# Patient Record
Sex: MALE | Race: WHITE | Employment: OTHER | URBAN - METROPOLITAN AREA
[De-identification: names, ages, dates, MRNs, and addresses within clinical notes are randomized per-mention and may not be internally consistent; named-entity substitution may affect disease eponyms.]

---

## 2022-08-18 ENCOUNTER — OFFICE VISIT (OUTPATIENT)
Dept: ORTHOPEDIC SURGERY | Age: 72
End: 2022-08-18
Payer: MEDICARE

## 2022-08-18 VITALS — BODY MASS INDEX: 23.8 KG/M2 | WEIGHT: 170 LBS | HEIGHT: 71 IN

## 2022-08-18 DIAGNOSIS — M25.562 LEFT KNEE PAIN, UNSPECIFIED CHRONICITY: Primary | ICD-10-CM

## 2022-08-18 PROCEDURE — G8428 CUR MEDS NOT DOCUMENT: HCPCS | Performed by: ORTHOPAEDIC SURGERY

## 2022-08-18 PROCEDURE — 1123F ACP DISCUSS/DSCN MKR DOCD: CPT | Performed by: ORTHOPAEDIC SURGERY

## 2022-08-18 PROCEDURE — 3017F COLORECTAL CA SCREEN DOC REV: CPT | Performed by: ORTHOPAEDIC SURGERY

## 2022-08-18 PROCEDURE — 99204 OFFICE O/P NEW MOD 45 MIN: CPT | Performed by: ORTHOPAEDIC SURGERY

## 2022-08-18 PROCEDURE — G8420 CALC BMI NORM PARAMETERS: HCPCS | Performed by: ORTHOPAEDIC SURGERY

## 2022-08-18 PROCEDURE — 1036F TOBACCO NON-USER: CPT | Performed by: ORTHOPAEDIC SURGERY

## 2022-08-18 RX ORDER — MULTIVITAMIN WITH FOLIC ACID 400 MCG
1 TABLET ORAL DAILY
COMMUNITY

## 2022-08-18 RX ORDER — NAPROXEN SODIUM 220 MG
220 TABLET ORAL 2 TIMES DAILY WITH MEALS
COMMUNITY

## 2022-08-18 RX ORDER — TADALAFIL 20 MG/1
20 TABLET ORAL DAILY PRN
COMMUNITY

## 2022-08-18 RX ORDER — ROSUVASTATIN CALCIUM 20 MG/1
20 TABLET, COATED ORAL DAILY
COMMUNITY

## 2022-08-18 NOTE — PROGRESS NOTES
Name: Arnie Hernandez. YOB: 1950  Gender: male  MRN: [id number]    CC:   Chief Complaint   Patient presents with    Knee Pain     Left Knee pain          HPI: Arnie Muller is a 66 y.o. male is here here for evaluation of bilateral knee pain. Left worse than right. The patient has had a previous ACL tear of the left knee requiring arthroscopy for debridement of the ACL. The pain has been present for years and is becoming worse. The pain is primarily on the Medial side of both knees  he describes the pain as aching  The pain is worse with going up and/or down stairs, rising after sitting, standing, and walking  The pain does not radiate down the leg. he denies numbness and tingling down the leg. Treatment so far has been prescription and OTC NSAIDS which have not effectively relieved pain/inflammation and activity modification with little relief. No Known Allergies      Review of Systems:  As per HPI. Pertinent positives and negatives are addressed with the patient, particularly those related to musculoskeletal concerns. Non-orthopaedic concerns were referred back to the primary care physician. PHYSICAL EXAMINATION:   The patient appears their stated age and they are in no distress. Ht 5' 11\" (1.803 m)   Wt 170 lb (77.1 kg)   BMI 23.71 kg/m²       The lower extremities are as described below. Circulation is normal with palpable pedal pulses bilaterally and no edema. There is no lymph adenopathy in the popliteal or malleolar region. The skin is without stasis disease distally bilaterally. Sensation is intact to light touch bilaterally. There is moderate tenderness to palpation over the medial joint line of the bilateral knee(s).   There is a large mass over the medial aspect of the left knee which could be a meniscal cyst.  No significant popliteal cyst.  The gait is noted to be with a slight trendelenburg and antalgia and stiff legged  Range of motion is 0-120 degrees on the right and 0-120 degrees on the left. bilateral knee: There is 2mm of anterior/posterior translation and 2mm of medial/lateral instability bilaterally. There is 5 degrees of varus alignment in the bilateral knee. Limb lengths are equal.  Quadriceps strength is excellent. Their judgment and insight are normal.  They are oriented to time, place and person. Their memory is good and the mood and affect appropriate. X-RAY:   AP, lateral, sunrise, and 45 degree  views of the bilateral knees are reviewed. There is joint space loss, eburnated bone, and osteophyte formation present. X-ray impression:  Advanced degenerative joint disease of bilateral  knee      IMPRESSION:     ICD-10-CM    1. Left knee pain, unspecified chronicity  M25.562 XR KNEE LEFT (MIN 4 VIEWS)     MRI KNEE LEFT W WO CONTRAST          RECOMMENDATIONS:    Reviewed x-ray findings with the patient. This patient's clinical history and physical exam is consistent with osteoarthritis of the bilateral  knee(s). We discussed the natural history of this condition as a degenerative process that causes inflammation of the joints due to a breakdown of cartilage, the hard, thick tissue that cushions the joints. We discussed that osteoarthritis never completely resolves on its own and symptoms including pain, stiffness, and swelling may wax and wane as the condition progresses. He understands that conservative treatments typically include activity modification, NSAIDs and physical therapy. Oral and/or steroid injections into the joint could be considered in resistant scenarios. Viscosupplementation injections may also be useful as a temporary cartilage replacement in certain patients. I advised to avoid any prolonged bedrest and to try to maintain ADLs as much as possible.  The patient was counseled to follow up with me should he develop any worsening symptoms that begin to limit activities of daily living.     ----TKA - Today we also discussed bilateral  knee replacement surgery. They are aware of the 1% risk of infection. They were also informed of the possibility of stroke, heart attack and blood clot; any of which could result in further hospitalization or death. I reviewed the procedure as well as the pre and post-operative process at length and written information was provided for further review. We are planning a Maljamar Makoplasty Robot Assisted TKA - The patient is aware that a preoperative 3D CT Scan is medically necessary for pre-op planning using the [employer]. This will be scheduled and arranged to be done prior to surgery. I have also recommended MRI scan of the left knee to make sure that this is a benign soft tissue mass and not some type of sarcoma.   4--this is a chronic illness/condition with exacerbation    Mónica Silva MD

## 2022-11-17 LAB — PROSTATE SPECIFIC ANTIGEN: 1.18 NG/ML

## 2023-01-16 ENCOUNTER — APPOINTMENT (OUTPATIENT)
Dept: CT IMAGING | Age: 73
End: 2023-01-16
Payer: MEDICARE

## 2023-01-16 ENCOUNTER — OFFICE VISIT (OUTPATIENT)
Dept: UROLOGY | Age: 73
End: 2023-01-16
Payer: MEDICARE

## 2023-01-16 ENCOUNTER — HOSPITAL ENCOUNTER (EMERGENCY)
Age: 73
Discharge: HOME | End: 2023-01-16
Attending: EMERGENCY MEDICINE | Admitting: EMERGENCY MEDICINE
Payer: MEDICARE

## 2023-01-16 VITALS
RESPIRATION RATE: 18 BRPM | BODY MASS INDEX: 23.8 KG/M2 | WEIGHT: 170 LBS | TEMPERATURE: 97.8 F | DIASTOLIC BLOOD PRESSURE: 99 MMHG | HEART RATE: 78 BPM | OXYGEN SATURATION: 97 % | SYSTOLIC BLOOD PRESSURE: 158 MMHG | HEIGHT: 71 IN

## 2023-01-16 DIAGNOSIS — S09.90XA INJURY OF HEAD, INITIAL ENCOUNTER: ICD-10-CM

## 2023-01-16 DIAGNOSIS — R97.20 ELEVATED PSA: Primary | ICD-10-CM

## 2023-01-16 DIAGNOSIS — S01.81XA FACIAL LACERATION, INITIAL ENCOUNTER: Primary | ICD-10-CM

## 2023-01-16 LAB
BILIRUBIN, URINE, POC: NEGATIVE
BLOOD URINE, POC: NEGATIVE
GLUCOSE URINE, POC: NEGATIVE
KETONES, URINE, POC: NEGATIVE
LEUKOCYTE ESTERASE, URINE, POC: NEGATIVE
NITRITE, URINE, POC: NEGATIVE
PH, URINE, POC: 6.5 (ref 4.6–8)
PROTEIN,URINE, POC: NEGATIVE
SPECIFIC GRAVITY, URINE, POC: 1.02 (ref 1–1.03)
URINALYSIS CLARITY, POC: NORMAL
URINALYSIS COLOR, POC: NORMAL
UROBILINOGEN, POC: NORMAL

## 2023-01-16 PROCEDURE — 1123F ACP DISCUSS/DSCN MKR DOCD: CPT | Performed by: UROLOGY

## 2023-01-16 PROCEDURE — G8420 CALC BMI NORM PARAMETERS: HCPCS | Performed by: UROLOGY

## 2023-01-16 PROCEDURE — 70450 CT HEAD/BRAIN W/O DYE: CPT

## 2023-01-16 PROCEDURE — 99284 EMERGENCY DEPT VISIT MOD MDM: CPT

## 2023-01-16 PROCEDURE — 81003 URINALYSIS AUTO W/O SCOPE: CPT | Performed by: UROLOGY

## 2023-01-16 PROCEDURE — 1036F TOBACCO NON-USER: CPT | Performed by: UROLOGY

## 2023-01-16 PROCEDURE — G8484 FLU IMMUNIZE NO ADMIN: HCPCS | Performed by: UROLOGY

## 2023-01-16 PROCEDURE — 72125 CT NECK SPINE W/O DYE: CPT

## 2023-01-16 PROCEDURE — 99204 OFFICE O/P NEW MOD 45 MIN: CPT | Performed by: UROLOGY

## 2023-01-16 PROCEDURE — G8427 DOCREV CUR MEDS BY ELIG CLIN: HCPCS | Performed by: UROLOGY

## 2023-01-16 PROCEDURE — 3017F COLORECTAL CA SCREEN DOC REV: CPT | Performed by: UROLOGY

## 2023-01-16 RX ORDER — TETANUS AND DIPHTHERIA TOXOIDS ADSORBED 2; 2 [LF]/.5ML; [LF]/.5ML
0.5 INJECTION INTRAMUSCULAR
Status: DISCONTINUED | OUTPATIENT
Start: 2023-01-16 | End: 2023-01-16

## 2023-01-16 ASSESSMENT — ENCOUNTER SYMPTOMS
EYE DISCHARGE: 0
EYE PAIN: 0
VOMITING: 0
APNEA: 0
ABDOMINAL PAIN: 0
DIARRHEA: 0
CONSTIPATION: 0
COUGH: 0
ABDOMINAL PAIN: 0
HEARTBURN: 1
EYE PAIN: 0
FACIAL SWELLING: 0
VOICE CHANGE: 0
BLOOD IN STOOL: 0
VOMITING: 0
PHOTOPHOBIA: 0
WHEEZING: 0
RHINORRHEA: 0
SKIN LESIONS: 0
EYE REDNESS: 0
TROUBLE SWALLOWING: 0
BACK PAIN: 0
WHEEZING: 0
DIARRHEA: 0
COUGH: 0
NAUSEA: 0
SHORTNESS OF BREATH: 0
CHEST TIGHTNESS: 0
SORE THROAT: 0
EYE DISCHARGE: 0
INDIGESTION: 1
SHORTNESS OF BREATH: 0

## 2023-01-16 ASSESSMENT — PAIN - FUNCTIONAL ASSESSMENT: PAIN_FUNCTIONAL_ASSESSMENT: 0-10

## 2023-01-16 ASSESSMENT — PAIN SCALES - GENERAL: PAINLEVEL_OUTOF10: 0

## 2023-01-16 NOTE — ED PROVIDER NOTES
Emergency Department Provider Note                   PCP:                Diamante Causey               Age: 74 y.o. Sex: male       ICD-10-CM    1. Facial laceration, initial encounter  S01.81XA       2. Injury of head, initial encounter  S09.90XA           DISPOSITION [place] 01/16/2023 12:49:38 PM        Medical Decision Making  In summary this is a 58-year-old male patient who presented for evaluation of laceration to head after a trip and fall that occurred last night. Low suspicion for serious cause of diagnosis such as brain bleed, basilar skull fracture, acute CVA. Grossly well-appearing with stable vitals. No active bleeding. Not on blood thinners. Clear mechanism causing the fall. Neurologic exam within normal limits. Agree with radiology interpretation the CT of head and neck unremarkable for acute process. Unfortunately did not get to fully evaluate the patient's laceration as he left without treatment being complete. My plan was to fully washout the laceration and evaluate for wound closure which is likely needed. However, the patient did receive his CT results prior to my being able to talk to him and decided that he was going to leave because he had someone to be. My nurse did try to stop him and get him to stay however he refused. He also refused to sign AMA and stated he did not have time. Amount and/or Complexity of Data Reviewed  Radiology: ordered. Risk  Prescription drug management. ED Course as of 01/16/23 1423   Mon Jan 16, 2023   1244 12:44 PM  [place] Zenaida to go discussed results with patient however was told that he had gotten them already on his phone and had left. He was apparently. Stating \"I have somewhere to be and cannot stay. \"  He did not get his tetanus shot and did not have his wound repaired today.  [NR]      ED Course User Index  [NR] DEVIN Branham        Orders Placed This Encounter   Procedures    CT Head W/O Contrast    CT CSpine W/O Contrast        Medications   tetanus & diphtheria toxoids (adult) 5-2 LFU injection 0.5 mL (0.5 mLs IntraMUSCular Not Given 1/16/23 1148)       New Prescriptions    No medications on file        Shellie Stuart is a 74 y.o. male who presents to the Emergency Department with chief complaint of    Chief Complaint   Patient presents with    Laceration    Head Injury      64-year-old male patient not on blood thinners presents today complaining of head laceration that occurred last night after tripping over his dog. He reports he hit his head against a wooden table. He states he cleaned it up at that time and went to bed. Today when he woke up he states he noticed there was still blood so he went to an urgent care who sent him here due to head injury. Denies any pain. He denies syncope, weakness, numbness, dysphagia, dysarthria, diplopia, dizziness, difficulty with gait, weakness. Unsure when last tetanus shot was but states he absolutely does not want a tetanus shot today. Patient is primary historian and quality is reliable. The history is provided by the patient. No  was used. Review of Systems   Constitutional:  Negative for fatigue and fever. HENT:  Negative for congestion, ear pain, facial swelling, hearing loss, rhinorrhea, sore throat, trouble swallowing and voice change. Eyes:  Negative for photophobia, pain, discharge, redness and visual disturbance. Respiratory:  Negative for apnea, cough, chest tightness, shortness of breath and wheezing. Cardiovascular:  Negative for chest pain and palpitations. Gastrointestinal:  Negative for abdominal pain, diarrhea and vomiting. Endocrine: Negative for polydipsia, polyphagia and polyuria. Genitourinary:  Negative for decreased urine volume, dysuria, flank pain, frequency and hematuria. Musculoskeletal:  Negative for arthralgias, joint swelling, myalgias and neck stiffness. Skin:  Positive for wound. Negative for rash. Neurological:  Negative for dizziness, syncope, speech difficulty, weakness, light-headedness and headaches. Hematological:  Does not bruise/bleed easily. Psychiatric/Behavioral:  Negative for self-injury and suicidal ideas. All other systems reviewed and are negative. History reviewed. No pertinent past medical history. History reviewed. No pertinent surgical history. History reviewed. No pertinent family history. Social History     Socioeconomic History    Marital status:      Spouse name: None    Number of children: None    Years of education: None    Highest education level: None   Tobacco Use    Smoking status: Never    Smokeless tobacco: Never         Patient has no known allergies. Previous Medications    MULTIPLE VITAMIN (MULTIVITAMIN) TABLET    Take 1 tablet by mouth daily    NAPROXEN SODIUM (ANAPROX) 220 MG TABLET    Take 220 mg by mouth 2 times daily (with meals)    ROSUVASTATIN (CRESTOR) 20 MG TABLET    Take 20 mg by mouth daily    TADALAFIL (CIALIS) 20 MG TABLET    Take 20 mg by mouth daily as needed        Vitals signs and nursing note reviewed. Patient Vitals for the past 4 hrs:   Temp Pulse Resp BP SpO2   01/16/23 1129 97.8 °F (36.6 °C) 78 18 (!) 158/99 97 %          Physical Exam  Vitals and nursing note reviewed. Constitutional:       General: He is not in acute distress. Appearance: Normal appearance. He is normal weight. He is not ill-appearing, toxic-appearing or diaphoretic. Comments: Overall very well-appearing and in no acute distress. Resting comfortably in chair. Obvious laceration to right side of forehead. Pleasant and cooperative. Alert and oriented x4. Normal mentation. No repetitive questioning. No active bleeding   HENT:      Head: Normocephalic and atraumatic. Comments: No skull deformity. There is a linear laceration in vertical plane on the right side of forehead approximately 4 cm long extending into subcutaneous tissue. Does not affect the galea. No signs of foreign body. Bleeding controlled. Right Ear: Tympanic membrane, ear canal and external ear normal. There is no impacted cerumen. Left Ear: Tympanic membrane, ear canal and external ear normal. There is no impacted cerumen. Ears:      Comments: No maya sign or hemotympanum bilaterally     Nose: Nose normal.      Comments: No septal hematoma bilaterally     Mouth/Throat:      Mouth: Mucous membranes are moist.      Pharynx: No oropharyngeal exudate or posterior oropharyngeal erythema. Comments: No signs of malocclusion or dental fracture  Eyes:      General: No scleral icterus. Right eye: No discharge. Left eye: No discharge. Extraocular Movements: Extraocular movements intact. Conjunctiva/sclera: Conjunctivae normal.      Pupils: Pupils are equal, round, and reactive to light. Comments: No nystagmus. Pupils equal reactive. No raccoon eyes bilaterally   Cardiovascular:      Rate and Rhythm: Normal rate and regular rhythm. Pulses: Normal pulses. Heart sounds: Normal heart sounds. Pulmonary:      Effort: Pulmonary effort is normal. No respiratory distress. Breath sounds: Normal breath sounds. No stridor. No wheezing, rhonchi or rales. Musculoskeletal:         General: Normal range of motion. Cervical back: Normal range of motion and neck supple. No rigidity or tenderness. Lymphadenopathy:      Cervical: No cervical adenopathy. Skin:     General: Skin is warm and dry. Capillary Refill: Capillary refill takes less than 2 seconds. Coloration: Skin is not jaundiced. Neurological:      General: No focal deficit present. Mental Status: He is alert and oriented to person, place, and time. Mental status is at baseline. Cranial Nerves: No cranial nerve deficit. Sensory: No sensory deficit. Motor: No weakness.       Coordination: Coordination normal.      Gait: Gait normal. Deep Tendon Reflexes: Reflexes normal.      Comments: No focal deficits found on exam.  [place] CT head score positive due to age        Procedures    Results for orders placed or performed during the hospital encounter of 01/16/23   CT Head W/O Contrast    Narrative    EXAMINATION: CT HEAD WO CONTRAST 1/16/2023 11:56 AM    ACCESSION NUMBER: [id number]    COMPARISON: None available    INDICATION: fall and head lac    TECHNIQUE: Multiple-row detector helical CT examination of the head without  intravenous contrast.     Radiation dose reduction techniques were used for this study. Our CT scanners  use one or all of the following: Automated exposure control, adjustment of the  mA and/or kV according to patient size, iterative reconstruction. FINDINGS:  BRAIN: No intracranial hemorrhage. No mass effect or herniation. The grey-white  matter differentiation is maintained. White matter is within normal limits for  age. VENTRICLES/EXTRA-AXIAL: No hydrocephalus or extra-axial fluid collection. BONES: No acute fracture. SOFT TISSUES: The paranasal sinuses and mastoid air cells are clear. Laceration  along the right forehead. Impression    1. No evidence of acute ischemia, hemorrhage, or mass effect. 2.  Scalp laceration along the right forehead. CT CSpine W/O Contrast    Narrative    EXAMINATION: CT CERVICAL SPINE WO CONTRAST 1/16/2023 11:56 AM    ACCESSION NUMBER: [id number]    COMPARISON: None available    INDICATION: fall and head lac    TECHNIQUE: Contiguous CT images of cervical spine were obtained without  intravenous contrast. Coronal and sagittal reformations were made. Radiation dose reduction techniques were used for this study. Our CT scanners  use one or all of the following: Automated exposure control, adjustment of the  mA and/or kV according to patient size, iterative reconstruction. FINDINGS:   ACUTE FINDINGS: No acute fracture.     VERTEBRAE: The craniocervical junction is unremarkable. The vertebral body  heights are maintained. Straightening of the normal cervical lordosis. DEGENERATIVE CHANGES: Moderate disc space narrowing with anterior osteophytes  extending from C4 to C7. Mild multilevel facet arthropathy. Fusion of the left  C2-C3 facets. SPINAL CANAL: No evidence of high grade central canal stenosis within the  limitations of this noncontrasted CT. SURGICAL CHANGES: None. SOFT TISSUES: No evidence of prevertebral soft tissue swelling. The  paravertebral soft tissues are unremarkable. Impression    1. No acute abnormality. 2.  Moderate multilevel degenerative changes. Results for orders placed or performed in visit on 01/16/23   AMB POC URINALYSIS DIP STICK AUTO W/O MICRO   Result Value Ref Range    Color (UA POC)      Clarity (UA POC)      Glucose, Urine, POC Negative Negative    Bilirubin, Urine, POC Negative Negative    KETONES, Urine, POC Negative Negative    Specific Gravity, Urine, POC 1.025 1.001 - 1.035    Blood (UA POC) Negative Negative    pH, Urine, POC 6.5 4.6 - 8.0    Protein, Urine, POC Negative Negative    Urobilinogen, POC 1 mg/dL     Nitrite, Urine, POC Negative Negative    Leukocyte Esterase, Urine, POC Negative Negative        CT Head W/O Contrast   Final Result   1. No evidence of acute ischemia, hemorrhage, or mass effect. 2.  Scalp laceration along the right forehead. CT CSpine W/O Contrast   Final Result   1. No acute abnormality. 2.  Moderate multilevel degenerative changes. Voice dictation software was used during the making of this note. This software is not perfect and grammatical and other typographical errors may be present. This note has not been completely proofread for errors.      Ellamae Holter, [place]  01/16/23 8286

## 2023-01-16 NOTE — PROGRESS NOTES
[facility]  [address] [place] [address] [address]  [place], [address] [facility]  [address] [facility] : 1950    Chief Complaint   Patient presents with    New Patient    Elevated PSA          HPI     Carlyn Stein is a 74 y.o.  male who is referred to clinic for elevated PSA evaluation. He also has PMH of ED and is on cialis for management of that. He reports having PSA at the [address] [place] [address],[address] in the fall  that as slightly elevated. He had PSA with his PCP 22 that was 5.1. He reports normal PSAs prior to . No known cause for PSA elevation. Denies FH of CAP. No bothersome urgency, frequency, hematuria, urinary incontinence. Does have nocturia 1 per night. Has never had biopsy / MRI prostate. Lab Results   Component Value Date    PSA 1.180 2022       History reviewed. No pertinent past medical history. History reviewed. No pertinent surgical history. Current Outpatient Medications   Medication Sig Dispense Refill    Multiple Vitamin (MULTIVITAMIN) tablet Take 1 tablet by mouth daily      naproxen sodium (ANAPROX) 220 MG tablet Take 220 mg by mouth 2 times daily (with meals)      rosuvastatin (CRESTOR) 20 MG tablet Take 20 mg by mouth daily      tadalafil (CIALIS) 20 MG tablet Take 20 mg by mouth daily as needed       No current facility-administered medications for this visit.      No Known Allergies  Social History     Socioeconomic History    Marital status:      Spouse name: Not on file    Number of children: Not on file    Years of education: Not on file    Highest education level: Not on file   Occupational History    Not on file   Tobacco Use    Smoking status: Never    Smokeless tobacco: Never   Substance and Sexual Activity    Alcohol use: Not on file    Drug use: Not on file    Sexual activity: Not on file   Other Topics Concern    Not on file   Social History Narrative    Not on file     Social Determinants of Health Financial Resource Strain: Not on file   Food Insecurity: Not on file   Transportation Needs: Not on file   Physical Activity: Not on file   Stress: Not on file   Social Connections: Not on file   Intimate Partner Violence: Not on file   Housing Stability: Not on file     History reviewed. No pertinent family history. Review of Systems  Constitutional:   Negative for fever, chills, appetite change, malaise/fatigue, headaches and weight loss. Skin:  Negative for skin lesions, rash and itching. Eyes:  Negative for visual disturbance, eye pain and eye discharge. ENT: Positive for high frequency hearing loss. Negative for difficulty articulating words, pain swallowing and dry mouth. Respiratory:  Negative for cough, blood in sputum, shortness of breath and wheezing. Cardiovascular:  Negative for chest pain, hypertension, irregular heartbeat, leg pain, leg swelling, regular rate and rhythm and varicose veins. GI: Positive for indigestion and heartburn. Negative for nausea, vomiting, abdominal pain, blood in stool, constipation and diarrhea. Genitourinary: Positive for nocturia and erectile dysfunction. Negative for urinary burning, hematuria, flank pain, recurrent UTIs, history of urolithiasis, slower stream, straining, urgency, leakage w/ urge, frequent urination, incomplete emptying, testicular pain, sexually transmitted disease, discharge and urethral stricture. Musculoskeletal:  Negative for back pain, bone pain, arthralgias, tenderness, muscle weakness and neck pain. Neurological:  Negative for dizziness, focal weakness, numbness, seizures and tremors. Psychological:  Negative for depression and psychiatric problem. Endocrine:  Negative for cold intolerance, thirst, excessive urination, fatigue and heat intolerance. Hem/Lymphatic: Positive for easy bruising. Negative for easy bleeding and frequent infections.     Urinalysis  UA - Dipstick  Results for orders placed or performed in visit on 01/16/23 AMB POC URINALYSIS DIP STICK AUTO W/O MICRO   Result Value Ref Range    Color (UA POC)      Clarity (UA POC)      Glucose, Urine, POC Negative Negative    Bilirubin, Urine, POC Negative Negative    KETONES, Urine, POC Negative Negative    Specific Gravity, Urine, POC 1.025 1.001 - 1.035    Blood (UA POC) Negative Negative    pH, Urine, POC 6.5 4.6 - 8.0    Protein, Urine, POC Negative Negative    Urobilinogen, POC 1 mg/dL     Nitrite, Urine, POC Negative Negative    Leukocyte Esterase, Urine, POC Negative Negative       UA - Micro  WBC - 0  RBC - 0  Bacteria - 0  Epith - 0    There were no vitals taken for this visit. GENERAL: No acute distress, Awake, Alert, Oriented X 3, Gait normal  CARDIAC: regular rate and rhythm  CHEST AND LUNG: Easy work of breathing, clear to auscultation bilaterally, no cyanosis  ABDOMEN: soft, non tender, non-distended, positive bowel sounds, no organomegaly, no palpable masses, no guarding, no rebound tenderness  YASMINE: 30 gram, symmetric, smooth without nodules. SKIN: No rash, no erythema, no lacerations or abrasions, no ecchymosis  NEUROLOGIC: cranial nerves 2-12 grossly intact       Assessment and Plan    ICD-10-CM    1. Elevated PSA  R97.20 AMB POC URINALYSIS DIP STICK AUTO W/O MICRO     PSA, Total and Free     PSA, Total and Free        We first discussed the physiology of psa. We also discussed potential causes of elevated psa including prostate cancer, inflammation, infection, BPH, and idiopathic elevations. Treatment options including rechecking psa vs. MRI prostate vs. going forward with prostate biopsy were discussed. Risks, benefits and alternatives were discussed. After weighing his options, the patient has decided to move forward with rechecking total and free PSA today. Will call with results.   If persistently elevated, then will proceed with MRI prostate    I have spent 45 minutes today reviewing previous notes, test results and face to face with the patient as well as documenting. Tito Lock M.D.     [facility]  [address], [facility] [address] [facility]  Phone: [phone]  Fax: [phone]

## 2023-01-16 NOTE — ED TRIAGE NOTES
Pt reports tripped over dog last night and struck head on table (+)4cm laceration R side of head (-)LOC, emesis  Pt denies being on blood thinners  A&Ox4

## 2023-01-16 NOTE — FLOWSHEET NOTE
Laceration approx 2 inches, noted on left forehead. Bleeding controlled.  Pt denies LOC or cspine tenderness

## 2023-01-16 NOTE — ED NOTES
Pt did not want to wait for wound closure. States has an appointment and has to leave. Pt encouraged to stay, still refusing. Refuses to sign AMA form, stating he does not have time.  Ambulated with steady gait, speaks in clear sentences     Ruthann Meyers RN  01/16/23 0390

## 2023-01-17 LAB
PSA FREE MFR SERPL: 24.4 %
PSA FREE SERPL-MCNC: 1 NG/ML
PSA SERPL-MCNC: 4.1 NG/ML

## 2023-01-17 NOTE — RESULT ENCOUNTER NOTE
Called patient with PSA results.  PSA down to 4.1 from 5.1. I recommended that we schedule follow up in 6 months with repeat PSA total and free prior to appointment.  He is in agreement.     Shayla, can you please schedule him for this follow up with PSA in 6 months?     Thanks!  Rambo

## 2024-02-03 NOTE — PROGRESS NOTES
motion and neck supple. No tenderness.   Skin:     General: Skin is warm and dry.   Neurological:      General: No focal deficit present.      Mental Status: He is alert and oriented to person, place, and time.   Psychiatric:         Mood and Affect: Mood normal.         Behavior: Behavior normal.          Medical problems and test results were reviewed with the patient today.        No results found for: \"CHOL\"  No results found for: \"TRIG\"  No results found for: \"HDL\"  No results found for: \"LDLCHOLESTEROL\", \"LDLCALC\"  No results found for: \"VLDL\"  No results found for: \"CHOLHDLRATIO\"     No results found for: \"NA\", \"K\", \"CL\", \"CO2\", \"BUN\", \"CREATININE\", \"GLUCOSE\", \"CALCIUM\"      No results found for: \"WBC\", \"HGB\", \"HCT\", \"MCV\", \"PLT\"     No results found for: \"TSHFT4\", \"TSH\"     No results found for: \"LABA1C\"    Results for orders placed or performed in visit on 02/05/24   EKG 12 Lead    Impression    Sinus Bradycardia -First degree A-V block 59bpm  Anand = 228  -Right bundle branch block.  NSSTTW changes        ASSESSMENT and PLAN    Mack was seen today for consultation and tachycardia.    Diagnoses and all orders for this visit:    Encounter to establish care  -     EKG 12 Lead    Tachycardia-possibly atrial fibrillation or atrial flutter.  Stay hydrated, minimize alcohol, caffeine, nicotine, check echo and 14-day Holter monitor.  Check daily blood pressure log and add beta-blockers as needed at follow-up.  -     Echo (TTE) complete (PRN contrast/bubble/strain/3D); Future  -     Extended cardiac holter monitor (48 hrs - 15 days); Future    Palpitations-as above.  Get recent labs from Dr. Mojica as well.  -     Echo (TTE) complete (PRN contrast/bubble/strain/3D); Future  -     Extended cardiac holter monitor (48 hrs - 15 days); Future    Dyslipidemia-continue healthy diet and lifestyle, continue rosuvastatin with outpatient PCP surveillance  -     Echo (TTE) complete (PRN contrast/bubble/strain/3D);

## 2024-02-05 ENCOUNTER — INITIAL CONSULT (OUTPATIENT)
Age: 74
End: 2024-02-05

## 2024-02-05 VITALS
SYSTOLIC BLOOD PRESSURE: 166 MMHG | HEART RATE: 59 BPM | HEIGHT: 71 IN | WEIGHT: 177.3 LBS | BODY MASS INDEX: 24.82 KG/M2 | DIASTOLIC BLOOD PRESSURE: 90 MMHG

## 2024-02-05 DIAGNOSIS — E78.5 DYSLIPIDEMIA: ICD-10-CM

## 2024-02-05 DIAGNOSIS — Z76.89 ENCOUNTER TO ESTABLISH CARE: Primary | ICD-10-CM

## 2024-02-05 DIAGNOSIS — R00.2 PALPITATIONS: ICD-10-CM

## 2024-02-05 DIAGNOSIS — R00.0 TACHYCARDIA: ICD-10-CM

## 2024-02-05 DIAGNOSIS — R03.0 ELEVATED BLOOD PRESSURE READING IN OFFICE WITHOUT DIAGNOSIS OF HYPERTENSION: ICD-10-CM

## 2024-02-05 DIAGNOSIS — R00.9 ELEVATED HEART RATE WITH ELEVATED BLOOD PRESSURE WITHOUT DIAGNOSIS OF HYPERTENSION: ICD-10-CM

## 2024-02-05 DIAGNOSIS — R03.0 ELEVATED HEART RATE WITH ELEVATED BLOOD PRESSURE WITHOUT DIAGNOSIS OF HYPERTENSION: ICD-10-CM

## 2024-02-05 RX ORDER — ESOMEPRAZOLE MAGNESIUM 20 MG/1
20 GRANULE, DELAYED RELEASE ORAL
COMMUNITY

## 2024-02-14 ENCOUNTER — TELEPHONE (OUTPATIENT)
Age: 74
End: 2024-02-14

## 2024-02-14 NOTE — TELEPHONE ENCOUNTER
Patient called stating the following :    Wearing Holter monitor for the last week  Has an MRI scheduled for tonite  Needs to remove the monitor  Next steps?    Please call and advise.

## 2024-02-14 NOTE — TELEPHONE ENCOUNTER
Ideally it would be best if he could postpone the MRI for a week until he has completed the monitor and mailed it back in.  If he cannot postpone the MRI until after the monitor is completed, then go ahead and get the MRI and take the Zio off.

## 2024-02-28 ENCOUNTER — TELEPHONE (OUTPATIENT)
Age: 74
End: 2024-02-28

## 2024-02-28 NOTE — TELEPHONE ENCOUNTER
Gildardo Duggan MD  [phone] 2/28/2024 Routine     Result Text  No definite atrial fibrillation or atrial flutter.  Occasional benign PACs and PVCs, recurrent bursts of atrial tachycardia/SVT lasting up to 16 hours.  Add Toprol-XL 25 mg nightly, stay well-hydrated, minimize alcohol and caffeine.  Follow-up to discuss further options including electrophysiology referral versus augmentation of antiarrhythmic therapy.

## 2024-02-28 NOTE — TELEPHONE ENCOUNTER
Received call from Izabella with [employer], reporting that patient's end of service report showed episode of SVT with HR-191 for 30 seconds on 2/14/24 at 8:20 am.

## 2024-02-29 ENCOUNTER — TELEPHONE (OUTPATIENT)
Age: 74
End: 2024-02-29

## 2024-02-29 RX ORDER — METOPROLOL SUCCINATE 25 MG/1
25 TABLET, EXTENDED RELEASE ORAL DAILY
Qty: 90 TABLET | Refills: 3 | Status: SHIPPED | OUTPATIENT
Start: 2024-02-29

## 2024-02-29 NOTE — TELEPHONE ENCOUNTER
Spoke to patient in regards to monitor report. Pt stated he had already spoke to a nurse in regards to this Rx for Metoprolol has been sent to pharmacy.

## 2024-02-29 NOTE — TELEPHONE ENCOUNTER
Advised patient of monitor results and Dr. Ruffin's response. Advised patient to continue to monitor BP and HR. Advised patient to call if no improvement or with any further questions or concerns. Patient verbalized understanding.   Requested Prescriptions     Signed Prescriptions Disp Refills    metoprolol succinate (TOPROL XL) 25 MG extended release tablet 90 tablet 3     Sig: Take 1 tablet by mouth daily     Authorizing Provider: LI RUFFIN     Ordering User: DEE VIGIL     Toprol XL 25 mg qd, as above, E-prescribed to [facility] in [place] at patient's request.

## 2024-02-29 NOTE — TELEPHONE ENCOUNTER
----- Message from Dena Hanley MA sent at 2/29/2024  8:38 AM EST -----    ----- Message -----  From: Gildardo Duggan MD  Sent: 2/28/2024  11:52 AM EST  To: Richard Ocampo MA      ----- Message -----  From: Gildardo Duggan MD  Sent: 2/28/2024  11:09 AM EST  To: Gildardo Duggan MD

## 2024-03-04 ENCOUNTER — TELEPHONE (OUTPATIENT)
Age: 74
End: 2024-03-04

## 2024-03-04 RX ORDER — METOPROLOL SUCCINATE 50 MG/1
50 TABLET, EXTENDED RELEASE ORAL DAILY
Qty: 1 TABLET | Refills: 0
Start: 2024-03-04

## 2024-03-04 NOTE — TELEPHONE ENCOUNTER
Patient is still have high HR even after taking metoprolol  170 - 180   Progress  Notes by Philomena Aguiar NP at 03/22/22 1400              Author: Philomena Aguiar NP  Service: --  Author Type: Nurse Practitioner      Filed: 03/22/22 3412  Encounter Date: 3/22/2022  Status: Signed         : Philomena Aguiar NP (Nurse Practitioner)                       [address] [place] [facility], [facility]   [phone]         Patient Name:  Richard Lake. YOB: 1948   MRN:  [id number]         Office Visit 3/22/2022           Chief Complaint       Patient presents with          Follow-up       Shortness of Breath          COPD              HISTORY OF PRESENT ILLNESS:         Mr. Noe Soto is a very pleasant 66year old male, PMH HTN, CAD, CABG in 2015, GERD, and prostate cancer, here today as a follow up for COPD. The patient states that he has been using Breztri inhaler BID and has not had any worsening symptoms. The patient had CPFTs done on 3/14/22 which revealed severe obstructive disease with FEV1 38%. The patient denies any sick symptoms or wheezing recently. Previously the patient had bilateral LE venous doppler and CT chest with contrast were both negative for PE/DVT previously. CT chest did reveal a 13 mm right upper kidney nodule that was recommended follow up with CT abd/pelvis. Follow up scan revealed that the nodule was a  12 mm hyperdense lesion that is a hemorrhagic cyst.  No follow up scan recommended for this. The patient does have a history of receiving varicose vein treatment (no stripping) and is being treated for prostate cancer (Lupron). The patient is a former 0.25 ppd X 40 years smoker, quit in 2001. The patient does have a hernia which he has been referred to surgery for.        Past Medical History:        Diagnosis  Date           Arrhythmia  8/2015 at [facility]          A-Flutter ablation--- dr Stacey Carranza           Aspirin long-term use             CAD (coronary artery disease)            mi--2/2015 cabg 2/2015---- No stents           CAD (coronary artery disease)  01/29/2021          PCI           CAD in native artery  2/19/2015          2/19/15 (Dr Elijah Serna) Coronary artery bypass grafting x2 with grafts consisting of bilateral mammaries            Cancer [facility])            prostate           Elevated hemidiaphragm  2/21/2015 2/20 barium swallow on 2/12/15 that revealed an unremarkable esophagus and paralyzed left diaphragm with an unusual rotation of the stomach in the  left upper quadrant  esophagram 2/12/2015 which revealed an abnormal contour the left hemidiaphragm, suggesting diaphragmatic hernia. 2/20 Barium Swallow Barium swallow today to assess for any signs of possible torsion. If no torsion present, will likely  recommend outpatient surgical consult and continued follow up with Dr. Bryant Mcpherson in [facility].              Elevated hemoglobin A1c  2/20/2015          6.1 - 2/17/15            Full dentures  2/21/2015       GERD (gastroesophageal reflux disease)            controlled with med           Hernia of abdominal cavity  6/26/2015       History of atrial flutter  8/2015          ablation           History of complete eye exam  01/01/2015          [facility]           Hypercholesterolemia         Hyperlipidemia         Hyperlipidemia  2/18/2015       Hypertension            controlled with med           Hypoxemia  2/21/2015       MI, old  2/2015       NSTEMI (non-ST elevated myocardial infarction) ([facility] [address]  2/14/2015       OA (osteoarthritis)  2/21/2015       Pleural effusion on right  2/21/2015       Postoperative anemia due to acute blood loss  2/19/2015       S/P CABG x 2  2/2015       Thrombocytopenia due to extra corporeal by-pass circulation  2/19/2015          Patient denies           Varicose veins of lower extremities with other complications  8/67/0907          7/24/15 (Dr Darby Hui) L GSV RFA ablation 5/15/14 (Dr. Darby Hui) R GSV Radiofrequency ablation            Wears continued follow up with Dr. Mirian Rose in [place]. CAD in native artery (Chronic)  ICD-10-CM: I25.10   ICD-9-CM: 414.01    2/19/2015          Overview Addendum 1/14/2021 12:50 PM by Teo Hayden MD            2/19/15 (Dr Diana Chavira) Coronary artery bypass grafting x2 with grafts   consisting of bilateral mammaries   Jun 2017 - Stress MPI - normal perfusion and LVEF in stage III with frequent PVC, couplet and triplets. Jan 2021- inferoapical scar and large anterolateral ischemia with normal wall motion and EF   Echo - mild LVH, Normal SF and DF                                 Varicose veins of lower extremities with other complications (Chronic)  ICD-10-CM: V50.712   ICD-9-CM: 454.8    4/22/2014          Overview Addendum 2/19/2015  3:47 PM by Luis Alberto Rios NP            7/24/15 (Dr Albertina Kate) L GSV RFA ablation   5/15/14 (Dr. Albertina Kate) R GSV Radiofrequency ablation                                        Past Surgical History:         Procedure  Laterality  Date            COLONOSCOPY  N/A  3/9/2018          COLONOSCOPY/ 28 performed by Charmaine Godfrey MD at [facility]  N/A  10/22/2021          COLONOSCOPY/BMI 30 performed by Galen Betancourt MD at [address] [place] [address]  HX COLONOSCOPY    2012       HX CORONARY ARTERY BYPASS GRAFT    2/15          DR. Pimentel            HX CORONARY STENT PLACEMENT    01/29/2021          Two 3.0 x 12 [place] drug-eluting stents            HX FRACTURE TX  Right  2007          elbow fx            HX HEART CATHETERIZATION    2/2015 and 8/15       HX HEART CATHETERIZATION    8/2015           [id number]            HX HERNIA REPAIR    2310          umbilical            HX HERNIA REPAIR  Left  unsure of 2nd surgery          LIH and redo embilical, Both Repairs at same time            HX KNEE ARTHROSCOPY  Left  2006          left knee            HX ORTHOPAEDIC  Right  2007          right elbow fx   HX VEIN STRIPPING           [place] CABG, ARTERY-VEIN, TWO              Dr. Abdiel Vaz FLX W/REMOVAL LESION BY  [address]    3/9/2018                       [place] COLSC FLX W/REMOVAL LESION BY HOT BX FORCEPS    10/22/2021                       [place] EGD TRANSORAL BIOPSY SINGLE/MULTIPLE    10/22/2021                       [place] LEFT HEART CATH,PERCUTANEOUS    2021          angioplasty and stenting of the two high-grade lesions in the circumflex           No flowsheet data found.              Social History         Socioeconomic History           Marital status:  SINGLE              Spouse name:  Not on file           Number of children:  Not on file       Years of education:  Not on file       Highest education level:  Not on file       Occupational History          Not on file       Tobacco Use           Smoking status:  Former Smoker              Packs/day:  0.25         Years:  40.00         Pack years:  10.00         Quit date:  2001         Years since quittin.9           Smokeless tobacco:  Never Used       Vaping Use           Vaping Use:  Never used       Substance and Sexual Activity           Alcohol use:  No       Drug use:  No       Sexual activity:  Not on file        Other Topics  Concern            Service  Not Asked       Blood Transfusions  Not Asked       Caffeine Concern  Not Asked       Occupational Exposure  Not Asked       Hobby Hazards  Not Asked       Sleep Concern  Not Asked       Stress Concern  Not Asked       Weight Concern  Not Asked       Special Diet  Not Asked       Back Care  Not Asked       Exercise  Not Asked       Bike Helmet  Not Asked     [place]  Not Asked       Self-Exams  Not Asked       Social History Narrative          Not on file         Social Determinants of Health         Financial Resource Strain:           Difficulty of Paying Living Expenses: Not on file       Food Insecurity:          Worried About [facility] in the Last Year: Not on file       Rubina of Food in the Last Year: Not on file       Transportation Needs:           Lack of Transportation (Medical): Not on file       Lack of Transportation (Non-Medical): Not on file       Physical Activity:           Days of Exercise per Week: Not on file       Minutes of Exercise per Session: Not on file       Stress:           Feeling of Stress : Not on file       Social Connections:           Frequency of Communication with Friends and Family: Not on file       Frequency of Social Gatherings with Friends and Family: Not on file       Attends Rastafarian Services: Not on file       Active Member of [address] [place] [address] or Organizations: Not on file       Attends Club or Organization Meetings: Not on file       Marital Status: Not on file       Intimate Partner Violence:           Fear of Current or Ex-Partner: Not on file       Emotionally Abused: Not on file       Physically Abused: Not on file       Sexually Abused: Not on file       Housing Stability:           Unable to Pay for Housing in the Last Year: Not on file          Number of [place] in the Last Year: Not on file          Unstable Housing in the Last Year: Not on file               Family History         Problem  Relation  Age of Onset            Heart Disease  Brother         Hypertension  Brother         Heart Disease  Father         Hypertension  Sister         Hypertension  Brother              No Known Problems  Mother                No Known Allergies          Current Outpatient Medications        Medication  Sig           budesonide-glycopyr-formoterol (Breztri Aerosphere) 160-9-4.8 mcg/actuation HFAA  Take 2 Puffs by inhalation two (2) times a day. Indications: bronchospasm prevention with COPD       albuterol (Ventolin HFA) 90 mcg/actuation inhaler  Take 2 Puffs by inhalation every six (6) hours as needed for Wheezing or Shortness of Breath.       tamsulosin (FLOMAX) 0.4 mg capsule  Take 1 Capsule by mouth two (2) times a day for 90 days.   doxazosin (CARDURA) 8 mg tablet  Take 1 Tablet by mouth two (2) times a day.   lisinopriL (PRINIVIL, ZESTRIL) 20 mg tablet  Take 1 tablet by mouth twice a day for hypertension       clopidogreL (Plavix) 75 mg tab  Take 1 Tablet by mouth daily.   metoprolol tartrate (LOPRESSOR) 25 mg tablet  Take 0.5 Tablets by mouth every twelve (12) hours.   pravastatin (PRAVACHOL) 80 mg tablet  Take 1 Tablet by mouth nightly.   omeprazole (PRILOSEC) 20 mg capsule  TAKE 1 CAPSULE TWO TIMES A DAY FOR GASTROESOPHAGEAL REFLUX       COQ10, LIPOSOMAL UBIQUINOL, PO  Take  by mouth daily.   magnesium 250 mg tab  Take  by mouth daily.   potassium 99 mg tablet  Take 99 mg by mouth daily.   SELENIUM PO  Take  by mouth daily.   ferrous sulfate 325 mg (65 mg iron) tablet  Take 1 Tablet by mouth Daily (before breakfast).   DISABLED PLACARD (DISABLED PLACARD) DMV  Please provide patient with handicap placard       nitroglycerin (NITROSTAT) 0.4 mg SL tablet  1 Tab by SubLINGual route every five (5) minutes as needed for Chest Pain. Up to 3 doses.   zinc 50 mg tab tablet  Take  by mouth daily.   aspirin delayed-release 81 mg tablet  Take 1 Tab by mouth daily.   ascorbic acid, vitamin C, (Vitamin C) 500 mg tablet  Take  by mouth daily.   Ca-D3-mag ox-zinc--linda-bor (CALCIUM 600-D3 PLUS) 600 mg calcium- 800 unit-50 mg tab  Take 1 Tab by mouth daily.   Fish Oil-Omega-3 Fatty Acids (FISH OIL OMEGA 3-6-9) 300-1,000 mg cpDR  Take 2 Tabs by mouth every morning. Indications: last dose 3/7/18           MULTIVITS-MINERALS/FA/LYCOPENE (ONE-A-DAY MEN'S PO)  Take 1 Tab by mouth daily. Indications: last dose 3/7/18         No current facility-administered medications for this visit. Review of Systems    Constitutional: Negative for chills, fever and malaise/fatigue. HENT: Negative for congestion, nosebleeds and sore throat. Respiratory: Positive for shortness of breath. Negative for cough, hemoptysis, sputum production and wheezing. Cardiovascular: Negative for chest pain, palpitations, orthopnea, leg swelling and PND. Gastrointestinal: Negative for abdominal pain, heartburn, nausea and vomiting. Neurological: Negative for dizziness, seizures and headaches. PHYSICAL EXAM:    /82   Pulse 63   Resp 18   Ht 5' 7\" (1.702 m)   Wt 192 lb 9.6 oz (87.4 kg)   SpO2 98%   BMI 30.17 kg/m²            GENERAL APPEARANCE:    The patient is normal weight and in no respiratory distress. HEENT:    PERRL. Conjunctivae unremarkable. Nasal mucosa is without epistaxis, exudate, or polyps. Gums and dentition are unremarkable. There is no oropharyngeal narrowing. TMs are clear. NECK/LYMPHATIC:    Symmetrical with no elevation of jugular venous pulsation. Trachea midline. No thyroid enlargement. No cervical adenopathy. LUNGS:    Normal respiratory effort with symmetrical lung expansion. Breath sounds clear    HEART:    There is a regular rate and rhythm. No murmur, rub, or gallop. There is trace edema in the lower extremities. ABDOMEN:    Soft and non-tender. No hepatosplenomegaly. Bowel sounds are normal.      NEURO:    The patient is alert and oriented to person, place, and time. Memory appears intact and mood is normal.  No gross sensorimotor deficits are present. DIAGNOSTIC TESTS:    PCXR:     Results for orders placed during the hospital encounter of 02/13/15     XR CHEST PORT           Narrative  Chest X-ray      INDICATION:   Nasogastric tube placement      A portable AP view of the chest was obtained. FINDINGS: A nasogastric tube is in place. Tip is not visible but probably in   the stomach. The air collection remains beneath the left diaphragm. There are   stable infiltrate/atelectasis in the right lung base. SKYLER Melendez   Electronically signed      Over 50% of today's office visit was spent in face to face time reviewing test results/records,  prognosis, importance of compliance, education about disease process, benefits of medications, instructions for management of acute flare-ups, and follow up plans. Total time spent was 25 minutes. Dictated using voice recognition software.   Proof read but unrecognized errors may exist.

## 2024-03-04 NOTE — TELEPHONE ENCOUNTER
Toprl xl 25mg qday added 2/28/24 due to PACs and PVCs, recurrent bursts of atrial tachycardia/SVT lasting up to 16 hours on monitor report.Has fu 3/20 w/.    I spoke w/pt.he said that he started the Toprol Saturday night so he has only had two dose.This am HR was in 160's for about 4 ours then came down to 85-90 right now.BP this am was 136/90.He ask if Toprol dose should be increased?

## 2024-03-16 NOTE — PROGRESS NOTES
[facility] CARDIOLOGY  [address] [place] [address], [address]  [place] [address]  PHONE: [phone]        NAME:  Vasyl Carlton Jr.  : 1950  MRN: [id number]     PCP:  Christel Mojica (Inactive)    SUBJECTIVE:   Vasyl Carlton Jr. is a 73 y.o. male seen for a follow-up visit regarding the following:     Chief Complaint   Patient presents with    Palpitations    Follow-up    Tachycardia        HPI:    He presented recently to establish new cardiac care.  He is active but not exercising as much as he used to after a knee replacement in the past.  Blood pressure has been trending upwards recently but he denies any angina, syncope, CHF, or palpitations.  However, his watch has notified him that his resting heart rate has been intermittently in the 120 to 130 bpm range despite the absence of symptoms.  Now that he has become more hyperaware of tachycardia, he thinks he does feel palpitations during the iWatch notifications.  Again, he denies any other associated symptoms.  ECG is sinus today and exam is benign but he certainly has risk factors for atrial fibrillation.    [facility] stress echo 2022:  REST IMAGES:Normal left ventricular chamber size. Normal global and regional left ventricular systolic function.   STRESS IMAGES:Stress echocardiogram negative for exercise-induced wall motion abnormalities. Ejection fraction response from 61% at rest to 70% at peak stress.   LIMITED VALVULAR AND HEMODYNAMIC ASSESSMENT   DIASTOLIC FUNCTIONLeft atrial volume index 48 ml/m2. Enlarged left atrial size. Indeterminate left ventricular diastolic function. No aortic valve regurgitation. No  aortic valve stenosis. Trivial mitral valve regurgitation. Trivial tricuspid valve   regurgitation.   STRESS TEST:The patient exercised for 9:00 min:sec on the Raji protocol. The patient achieved a workload of 10 METS and 111% FAC. The patient's exercise capacity was good although peak performance may have been compromised by the

## 2024-03-20 ENCOUNTER — OFFICE VISIT (OUTPATIENT)
Age: 74
End: 2024-03-20
Payer: MEDICARE

## 2024-03-20 VITALS
DIASTOLIC BLOOD PRESSURE: 88 MMHG | SYSTOLIC BLOOD PRESSURE: 138 MMHG | WEIGHT: 177 LBS | HEART RATE: 68 BPM | BODY MASS INDEX: 24.69 KG/M2

## 2024-03-20 DIAGNOSIS — E78.5 DYSLIPIDEMIA: ICD-10-CM

## 2024-03-20 DIAGNOSIS — R00.2 PALPITATIONS: ICD-10-CM

## 2024-03-20 DIAGNOSIS — R03.0 ELEVATED BLOOD PRESSURE READING IN OFFICE WITHOUT DIAGNOSIS OF HYPERTENSION: Primary | ICD-10-CM

## 2024-03-20 PROCEDURE — 99214 OFFICE O/P EST MOD 30 MIN: CPT | Performed by: INTERNAL MEDICINE

## 2024-03-20 PROCEDURE — G8484 FLU IMMUNIZE NO ADMIN: HCPCS | Performed by: INTERNAL MEDICINE

## 2024-03-20 PROCEDURE — 1036F TOBACCO NON-USER: CPT | Performed by: INTERNAL MEDICINE

## 2024-03-20 PROCEDURE — 3017F COLORECTAL CA SCREEN DOC REV: CPT | Performed by: INTERNAL MEDICINE

## 2024-03-20 PROCEDURE — 1123F ACP DISCUSS/DSCN MKR DOCD: CPT | Performed by: INTERNAL MEDICINE

## 2024-03-20 PROCEDURE — 93000 ELECTROCARDIOGRAM COMPLETE: CPT | Performed by: INTERNAL MEDICINE

## 2024-03-20 PROCEDURE — G8427 DOCREV CUR MEDS BY ELIG CLIN: HCPCS | Performed by: INTERNAL MEDICINE

## 2024-03-20 PROCEDURE — G8420 CALC BMI NORM PARAMETERS: HCPCS | Performed by: INTERNAL MEDICINE

## 2024-03-20 RX ORDER — IRBESARTAN 75 MG/1
75 TABLET ORAL DAILY
Qty: 30 TABLET | Refills: 11 | Status: SHIPPED | OUTPATIENT
Start: 2024-03-20

## 2024-04-01 ENCOUNTER — TELEPHONE (OUTPATIENT)
Age: 74
End: 2024-04-01

## 2024-04-01 NOTE — TELEPHONE ENCOUNTER
Pt called and stated that his BP was low this morning and now this afternoon he is have a racing HR. Pt said he does not have any symptoms other them being fatigue.  At 9:30 am /80 HR 70 (lightheaded and dizzy)  At 2:30 pm /93    Asked what he should do. Please call and advise

## 2024-04-01 NOTE — TELEPHONE ENCOUNTER
Pt states he was dizzy this morning especially after bending over. VS at that time: /80  HR 65  This afternoon BP elevated 190/87  .    Now HR 75.   BP: 190/86    Pt states [place] is asymptomatic[place].   checking BP with wrist cuff.   Due for metoprolol dose    Pt states he will get arm cuff and recheck BP after sitting and resting for 5-10 min.  Advised pt to take metoprolol as prescribed;  if BP still elevated greater than 170/110, call on-call physician.  If he develops any worrisome symptoms with elevated BP, proceed to ER.  Pt verbalizes understanding to all and agrees to plan.    Triage will check on pt tomorrow 4/2/24.

## 2024-04-02 ENCOUNTER — TELEPHONE (OUTPATIENT)
Age: 74
End: 2024-04-02

## 2024-04-02 NOTE — TELEPHONE ENCOUNTER
BP now 190/117 via wrist cuff  HR 60. Pt denies any symptoms  Triage informed Dr. Duggan.  Per Dr. Duggan, have pt take Avapro 150mg now.  Increase Avapro to 150mg q am  Check resting afternoon BP, keep a log, and call back with update on Friday.  Minimize sodium in diet, caffeine, nicotine, and alcohol.    Triage informed pt of Dr. Duggan' response. He verbalizes understanding to all and agrees to plan. He is buying an arm BP cuff now.

## 2024-04-02 NOTE — TELEPHONE ENCOUNTER
Pt reports BP from arm cuff 150/82, 143/83.  Pt took additional Avapro 150mg as directed by Dr. Duggan.    Triage informed Dr. Duggan. Per Dr. Duggan, continue with same plan in other 4/2/24 telephone encounter note.  Triage informed pt of Dr. Duggan' response. Pt verbalizes understanding and agrees to plan.

## 2024-04-05 ENCOUNTER — TELEPHONE (OUTPATIENT)
Age: 74
End: 2024-04-05

## 2024-04-05 RX ORDER — IRBESARTAN 75 MG/1
150 TABLET ORAL DAILY
Qty: 90 TABLET | Refills: 3 | Status: SHIPPED | OUTPATIENT
Start: 2024-04-05

## 2024-04-05 NOTE — TELEPHONE ENCOUNTER
Pt called stated he is suppose to call back and ask for Florecita, previously spoken to Marleny past few days. Please call and advise

## 2024-04-05 NOTE — TELEPHONE ENCOUNTER
Agree, give it some time for it to reach full effect.  Continue 150 mg daily, minimize sodium, alcohol, caffeine.  Stay hydrated and walk daily for exercise or get some sort of cardiovascular exercise every day if possible.  Check resting/relaxed blood pressure after lunch each day and call me next Thursday or Friday with an update

## 2024-04-05 NOTE — TELEPHONE ENCOUNTER
Spoke with pt he gave nurse BP report.    BP has been ranging 130-high 110/80-high 60s  HR ranging 60-70    Pt stated he is still a little dizzy when changing positions    Nurse informed him that those BP number have improved and that it could take time for his body to adjust to new BP levels and for meds to take full effect in his system.   Pt v/u    Nurse advised him to stay well hydrated and practice slow position changes in intervals. Keep checking his BP and call us if the dizziness gets worst, until then keep up the meds regime give us a BP check in a week.   Pt v/u    Pt also ran low on his irbesartan and wanted his prescription sent to a pharmacy closer to  his house.     Pharmacy changed and prescription sent as seen below.   Requested Prescriptions     Signed Prescriptions Disp Refills    irbesartan (AVAPRO) 75 MG tablet 90 tablet 3     Sig: Take 2 tablets by mouth daily     Authorizing Provider: LI RUFFIN     Ordering User: LIV HERNANDEZ

## 2024-04-10 ENCOUNTER — TELEPHONE (OUTPATIENT)
Age: 74
End: 2024-04-10

## 2024-04-10 NOTE — TELEPHONE ENCOUNTER
Pt c/o light headed, feels kind of \"drunk\".  104/75/128.  Pt states was at family  all day.   Encourage pt to try po hydration with water.   Elevate feet when sitting.  If sx do not resolve within ~1hr, advise ER.   Invite pt to return call, prn.  Will call and check status ~1hr.  Pt voiced understanding and agreement with POC.  cgh

## 2024-04-10 NOTE — TELEPHONE ENCOUNTER
Called pt to check status.   Pt states drank 1qt water over past hour.  Could watch HR slow down on apple watch.  137/85/69, 124/90/79.   Encourage 2 qts water daily, spread out through the day.  Informed pt ok to divide Irbesartan dose and take BID, instead of 2 tabs in AM. May reduce BP drop, \"drunk feeling\".   Increase hydration if working or recreating outdoors in warmer weather.   Return call, prn.   Pt voiced understanding and thanks. cgh

## 2024-04-10 NOTE — TELEPHONE ENCOUNTER
Pt called and stated that his most recent BP was 104/75 and HR of 128. Stated he's been very lightheaded.    Also gave a review of the past few BP readings  147/85 HR 95  122/69 HR 77  146/89 HR71  115/67 HR 66  124/86 HR81  124/94 HR 94  149/75 HR 63

## 2024-05-20 ENCOUNTER — TELEPHONE (OUTPATIENT)
Age: 74
End: 2024-05-20

## 2024-05-20 NOTE — TELEPHONE ENCOUNTER
Pt c/o fluctuations in HR over the past several days.    5/16  /91    5/17        142/86         85  5/18        106/68        126, rhythm felt regular and slightly rapid.  5/19        141/73          68    Pt states this [place] he [place] and had coffee, beer, some salty food, and very little water.  Felt a little dizzy and slightly dull headache, but felt better after hydrating with water and HR came down.    Triage encouraged 60-80 oz/day water intake, monitor HR and BP. Check BP after sitting and resting 5-10 min. Pt verbalizes understanding and agrees to plan. Call our office back if needed.

## 2024-08-05 ENCOUNTER — TELEPHONE (OUTPATIENT)
Age: 74
End: 2024-08-05

## 2024-08-05 ENCOUNTER — NURSE ONLY (OUTPATIENT)
Age: 74
End: 2024-08-05
Payer: MEDICARE

## 2024-08-05 VITALS — SYSTOLIC BLOOD PRESSURE: 130 MMHG | HEART RATE: 68 BPM | DIASTOLIC BLOOD PRESSURE: 88 MMHG

## 2024-08-05 DIAGNOSIS — E78.5 DYSLIPIDEMIA: ICD-10-CM

## 2024-08-05 DIAGNOSIS — R03.0 ELEVATED BLOOD PRESSURE READING IN OFFICE WITHOUT DIAGNOSIS OF HYPERTENSION: ICD-10-CM

## 2024-08-05 DIAGNOSIS — R00.0 TACHYCARDIA: Primary | ICD-10-CM

## 2024-08-05 DIAGNOSIS — I49.5 BRADY-TACHY SYNDROME (HCC): ICD-10-CM

## 2024-08-05 PROCEDURE — 93000 ELECTROCARDIOGRAM COMPLETE: CPT | Performed by: NURSE PRACTITIONER

## 2024-08-05 NOTE — TELEPHONE ENCOUNTER
Pt states he played golf yesterday and HR is fluctuating.  VS now 129/82 and .  Encouraged pt to hydrate with water.    Triage will inform Dr. Duggan and call pt back with his response.

## 2024-08-05 NOTE — TELEPHONE ENCOUNTER
Pt asymptomatic. Triage informed pt of Dr. Duggan' response. He verbalizes understanding and agrees to plan.  Appt scheduled for 8/5/24 at 3:45 at [facility] office. Pt confirms appt date, time, and location.

## 2024-08-05 NOTE — TELEPHONE ENCOUNTER
Yes, he may be in Afib.  If in AF, needs to see someone in office at time of ECG for meds adjustments.   If any CP, SOB, presyncope, needs to go to the ER instead.

## 2024-08-05 NOTE — TELEPHONE ENCOUNTER
Patient called explaining he needs to speak to a nurse regarding his blood pressure. He states this morning his bp is 112/63 and hr. is 54. Yesterday around 1pm it was 134/65 hr. 79.patient states he's feeling dizzy, and just weak all around.

## 2024-08-07 ENCOUNTER — TELEPHONE (OUTPATIENT)
Age: 74
End: 2024-08-07

## 2024-08-07 NOTE — TELEPHONE ENCOUNTER
Triage received call from [employer]     Today at 0254  Pt had 5.1 second pause   Event was auto-triggered.

## 2024-08-07 NOTE — TELEPHONE ENCOUNTER
Pt returned call. Went to play golf.  Apple watch noted  bpm. Stopped golfing.  Drove home. Denies light headedness, dizziness, palpitations.   Dull headache.   VS since home:  174/90/120  160/87/113  148/95/110  1664/111/111  Informed Dr. Duggan' Dr. Duggan orders stay out of the heat until this settles down. Ok to take extra Toprol 50mg now. [facility]  Informed pt of Dr. Duggan' response. Pt states VS coming down to 154/92/106. Advise ok to start with 1/2 tab Toprol 50mg.  Take 25mg now. Monitor. If VS don't come down wnl ok to repeat dose.  To ER for worsening sx. /> sustained 1 hr or more, to ER.  Pt voiced understanding and agreement with POC.  [facility]

## 2024-08-07 NOTE — TELEPHONE ENCOUNTER
Informed pt of monitor reports and Dr. Duggan' response. Pt voiced understanding.  States thing that affects HR the most for him is hydration with water. When he forgets to hydrate, HR speeds up and is irregular. Asks if ok to play golf and go to gym? Confirmed yes, especially when wearing monitor to capture any irregularities. Pt voiced understanding and agreement with POC.  He will return call prn. Will continue without change at this time.  cgh

## 2024-08-07 NOTE — TELEPHONE ENCOUNTER
Atach vs. Atypical flutter, broke.  Continue meds, continue monitoring and avoid ETOH and caffeine.  Continue monitoring.

## 2024-08-12 ENCOUNTER — TELEPHONE (OUTPATIENT)
Age: 74
End: 2024-08-12

## 2024-08-12 NOTE — TELEPHONE ENCOUNTER
Kylah with [employer] gave us a call to let us know that they have been trying to reach the patient and requested that if we call him or if he calls us then to inform him that [employer] is trying to reach him.     Ref: [id number]    Call back: [phone]   Not cleared until follow-up with orthopedic sports MD for further evaluation of right knee (MCL- imaging recommended) and elbow pain (bursitis)?

## 2024-08-15 ENCOUNTER — TELEPHONE (OUTPATIENT)
Age: 74
End: 2024-08-15

## 2024-08-15 NOTE — TELEPHONE ENCOUNTER
Pt states completed heart monitor Sat.   Played golf Sunday.  HR went up to 140 bpm. He stopped golfing. Started hydrating. Cooled off. HR returned to wnl.  Starting to recognize limits, paying attention to hydrating.   Forgot to take Toprol 25mg one night. Took the next morning, then resumed nightly dosing.  Confirmed ok.   Pt asks if ok to drink Gatorade, Powerade? Confirmed x1 daily but water is most beneficial for hydration.   VS  140/84/68 161/78/72 144/73/72 119/68/45  137/73/72 130/46/46 152/69/81  Continue to keep log, bring to FU. If VS reading looks weird, reposition cuff and repeat x1. Write down questions for f/u or return call, prn. Pt voiced understanding and thanks.  cgh

## 2024-08-19 ENCOUNTER — TELEPHONE (OUTPATIENT)
Age: 74
End: 2024-08-19

## 2024-08-19 NOTE — TELEPHONE ENCOUNTER
Patient spoke with triage nurse last week and was told to call back on Monday regarding his irregular heart beat and new medication.

## 2024-08-19 NOTE — TELEPHONE ENCOUNTER
It is okay but his blood pressure is higher taking the very low dose.  This will not only cause cardiovascular problems later in life but it will also cause more A-fib due to stretch on the cardiac chambers.  I would encourage him to try to tolerate 75 mg twice daily. It usually takes a couple of weeks to get used to but if he hydrates aggressively especially while he is out hot side or doing sports, he will probably end up tolerating 75 mg twice daily just fine.

## 2024-08-19 NOTE — TELEPHONE ENCOUNTER
Pt states that while playing golf a few days ago his HR was 120 while playing and in the 80's by the end of playing.   Pt states that he has been taking Irbesartan 37.5 mg BID and Metoprolol 25 mg nightly.     BP around an hour ago was 160/71 and HR was 60.  BP while on the phone was 144/70 and HR was 67.    Pt states that he has been feeling better taking this dose of irbesartan and would like to know if it is okay to continue taking it.

## 2024-08-20 NOTE — TELEPHONE ENCOUNTER
Triage informed pt of Dr. Duggan' response.Pt states he will attempt tolerating irbesartan 75 mg BID. Pt encouraged to keep a BP log, stay hydrated, and call with any issues.     Pt verbalizes understanding and agrees to plan.

## 2024-08-21 ENCOUNTER — TELEPHONE (OUTPATIENT)
Age: 74
End: 2024-08-21

## 2024-08-21 NOTE — TELEPHONE ENCOUNTER
Called and spoke with pt.     Pt states after resting BP is 129/68 and HR is 68.     Pt was continuously checking his blood pressure while on the phone. Pt informed that the more he checks his BP the more likely it is for it to increase. Pt believes that the irbesartan is what is making his HR increase.     Pt informed to continue irbesartan 75 mg BID as directed in triage encounter on 8/19/24 and to keep log of blood pressures and bring it to follow up with Dr. Duggan.

## 2024-08-21 NOTE — TELEPHONE ENCOUNTER
Pt called and states that he went to play golf this morning.     BP is 120/94 after taking irbesartan 75 mg this morning.   Pt states that his HR has been around 114 for the last hour and is not coming down.     BP on the phone is 140/92 with . Pt states that he has only been sitting for about the last 3 minutes.    Pt states that he has had about 60 oz of water today.     Pt does have a slight headache. Pt denies any other symptoms.    Pt informed to sit and rest for 15 minutes and this nurse would call back and check on him.

## 2024-08-21 NOTE — TELEPHONE ENCOUNTER
Heart rate is back down, he is wearing a monitor right now.  He just needs to continue his lifestyle, and hit the button anytime his heart rate is over 110 bpm and once I get the monitor results back I will call him and go over the results.  He just needs to stay well-hydrated, minimize alcohol, caffeine, and nicotine, and continue current meds until I let them know what I see on the monitor.

## 2024-08-22 ENCOUNTER — TELEPHONE (OUTPATIENT)
Age: 74
End: 2024-08-22

## 2024-08-22 NOTE — TELEPHONE ENCOUNTER
----- Message from Dr. Gildardo Duggan MD sent at 8/21/2024  5:14 PM EDT -----  I called and spoke to them.  He is either having atrial tachycardia or atypical flutter.  We will increase Toprol-XL to 25 mg twice daily.  He is already taking irbesartan 75 mg twice daily  He will call me with an update in 5 to 7 days.  If worsening palpitations or no improvement, we will need to consider adding flecainide  ----- Message -----  From: Gildardo Duggan MD  Sent: 8/21/2024   5:08 PM EDT  To: Gildardo Duggan MD

## 2024-08-28 ENCOUNTER — TELEPHONE (OUTPATIENT)
Age: 74
End: 2024-08-28

## 2024-08-30 NOTE — PROGRESS NOTES
symptoms.    Palpitations-as above.  Add flecainide and decrease metoprolol today.    Dyslipidemia-continue healthy diet and lifestyle, continue rosuvastatin with outpatient PCP surveillance    Essential hypertension-persistent hypertension on BP log over the past few weeks despite compliance with lifestyle and taking Toprol-XL.  Continue 25 mg Toprol-XL once daily and continue irbesartan 75 mg twice daily.  Continue to minimize alcohol, caffeine, and sodium.  Continue cardiovascular exercise and follow-up per routine               Return in about 4 months (around 1/4/2025).         LI RUFFIN MD  09/04/24  3:26 PM

## 2024-09-04 ENCOUNTER — OFFICE VISIT (OUTPATIENT)
Age: 74
End: 2024-09-04
Payer: MEDICARE

## 2024-09-04 VITALS
BODY MASS INDEX: 24.36 KG/M2 | DIASTOLIC BLOOD PRESSURE: 70 MMHG | HEART RATE: 52 BPM | WEIGHT: 174 LBS | HEIGHT: 71 IN | SYSTOLIC BLOOD PRESSURE: 122 MMHG

## 2024-09-04 DIAGNOSIS — R03.0 ELEVATED BLOOD PRESSURE READING IN OFFICE WITHOUT DIAGNOSIS OF HYPERTENSION: ICD-10-CM

## 2024-09-04 DIAGNOSIS — R00.2 PALPITATIONS: ICD-10-CM

## 2024-09-04 DIAGNOSIS — R00.0 TACHYCARDIA: ICD-10-CM

## 2024-09-04 DIAGNOSIS — E78.5 DYSLIPIDEMIA: Primary | ICD-10-CM

## 2024-09-04 PROCEDURE — 1123F ACP DISCUSS/DSCN MKR DOCD: CPT | Performed by: INTERNAL MEDICINE

## 2024-09-04 PROCEDURE — 1036F TOBACCO NON-USER: CPT | Performed by: INTERNAL MEDICINE

## 2024-09-04 PROCEDURE — 3017F COLORECTAL CA SCREEN DOC REV: CPT | Performed by: INTERNAL MEDICINE

## 2024-09-04 PROCEDURE — 99214 OFFICE O/P EST MOD 30 MIN: CPT | Performed by: INTERNAL MEDICINE

## 2024-09-04 PROCEDURE — G8427 DOCREV CUR MEDS BY ELIG CLIN: HCPCS | Performed by: INTERNAL MEDICINE

## 2024-09-04 PROCEDURE — G8420 CALC BMI NORM PARAMETERS: HCPCS | Performed by: INTERNAL MEDICINE

## 2024-09-04 RX ORDER — FLECAINIDE ACETATE 50 MG/1
50 TABLET ORAL 2 TIMES DAILY
Qty: 60 TABLET | Refills: 11 | Status: SHIPPED | OUTPATIENT
Start: 2024-09-04

## 2024-09-04 RX ORDER — METOPROLOL SUCCINATE 25 MG/1
25 TABLET, EXTENDED RELEASE ORAL DAILY
Qty: 90 TABLET | Refills: 3 | Status: SHIPPED | OUTPATIENT
Start: 2024-09-04

## 2024-09-09 ENCOUNTER — NURSE ONLY (OUTPATIENT)
Age: 74
End: 2024-09-09
Payer: MEDICARE

## 2024-09-09 VITALS — SYSTOLIC BLOOD PRESSURE: 128 MMHG | DIASTOLIC BLOOD PRESSURE: 78 MMHG | HEART RATE: 61 BPM

## 2024-09-09 DIAGNOSIS — R00.2 PALPITATIONS: Primary | ICD-10-CM

## 2024-09-09 DIAGNOSIS — R00.0 TACHYCARDIA: ICD-10-CM

## 2024-09-09 PROCEDURE — 93000 ELECTROCARDIOGRAM COMPLETE: CPT | Performed by: INTERNAL MEDICINE

## 2024-09-12 ENCOUNTER — TELEPHONE (OUTPATIENT)
Age: 74
End: 2024-09-12

## 2024-09-16 ENCOUNTER — TELEPHONE (OUTPATIENT)
Age: 74
End: 2024-09-16

## 2024-09-30 RX ORDER — IRBESARTAN 75 MG/1
75 TABLET ORAL 2 TIMES DAILY
Qty: 180 TABLET | Refills: 3 | Status: SHIPPED | OUTPATIENT
Start: 2024-09-30

## 2024-09-30 NOTE — TELEPHONE ENCOUNTER
Requested Prescriptions     Pending Prescriptions Disp Refills    irbesartan (AVAPRO) 75 MG tablet [Pharmacy Med Name: IRBESARTAN 75 MG TABLET] 180 tablet 3     Sig: Take 1 tablet by mouth in the morning and at bedtime     Verified rx in last OV date 09/04/24. Pharmacy confirmed. Erx as requested.

## 2024-10-04 ENCOUNTER — TELEPHONE (OUTPATIENT)
Age: 74
End: 2024-10-04

## 2024-10-04 NOTE — TELEPHONE ENCOUNTER
Pt c/o some dizziness and fluctuations in BP and HR. Pt denies chest pain or shortness of breath.    Pt states that he has been staying hydrated.     Readings over the last few days have been:  122/69 HR 52  115/60 HR 64  97/50 HR 44  126/63 HR 48  145/75 HR 56    Meds:  Metoprolol 25 mg BID  Irbesartan 75 mg BID

## 2024-10-04 NOTE — TELEPHONE ENCOUNTER
Pt states he needs to speak to a triage nurse because they have been tracking their bp and states its bouncing around the place and pt would also like clarification on the new rx's

## 2024-12-11 ENCOUNTER — TELEPHONE (OUTPATIENT)
Age: 74
End: 2024-12-11

## 2024-12-11 NOTE — TELEPHONE ENCOUNTER
Patient called stating he has the following issues :    UG=322/80  72  Erratic HR  Noticed especially at gym this AM  Meds were adjusted recently  Hydration seems to help

## 2024-12-11 NOTE — TELEPHONE ENCOUNTER
S/w patient.     Reports HR was 145 while he was at the gym riding an exercise bike. When he got off the bike, BP was 155/90     Now that he is home, /78, HR 75. Patient states since his medications were adjusted, his HR has been much more consistent in the 60's-70's. He states he is doing better with Hydration but states he forgets sometimes & doesn't get in as much water as he should.     Patient states he feels fine. Denies dizziness, CP or SOB.    I informed the patient that this is a normal BP & HR response to exercise. I also informed him that his overall BP & HR are much better. I advised pt to make sure he has been sitting at least 15 minutes before checking BP & HR.     I also advised him to take BP in the morning 2-3 hours after taking his BP meds. I encouraged consistent Hydration. I asked him to continue to keep a log of his BP & HR & let us know if he has any problems or concerns.

## 2025-01-05 NOTE — PROGRESS NOTES
[facility] CARDIOLOGY  [address] [place] [address], [address]  [place] [address]  PHONE: [phone]        NAME:  Vasyl Carlton Jr.  : 1950  MRN: [id number]     PCP:  Christel Mojica (Inactive)    SUBJECTIVE:   Vasyl Carlton Jr. is a 74 y.o. male seen for a follow-up visit regarding the following:     Chief Complaint   Patient presents with    Dyslipidemia        HPI:    He presented recently to establish new cardiac care.  His watch has notified him that his resting heart rate has been intermittently in the 120 to 130 bpm range despite the absence of symptoms.  Now that he has become more hyperaware of tachycardia, he thinks he does feel palpitations during the iWatch notifications.  Again, he denies any other associated symptoms.  ECG is sinus today and exam is benign but he certainly has risk factors for atrial fibrillation.    [facility] stress echo 2022:  REST IMAGES:Normal left ventricular chamber size. Normal global and regional left ventricular systolic function.   STRESS IMAGES:Stress echocardiogram negative for exercise-induced wall motion abnormalities. Ejection fraction response from 61% at rest to 70% at peak stress.   LIMITED VALVULAR AND HEMODYNAMIC ASSESSMENT   DIASTOLIC FUNCTIONLeft atrial volume index 48 ml/m2. Enlarged left atrial size. Indeterminate left ventricular diastolic function. No aortic valve regurgitation. No  aortic valve stenosis. Trivial mitral valve regurgitation. Trivial tricuspid valve   regurgitation.   STRESS TEST:The patient exercised for 9:00 min:sec on the Raji protocol. The patient achieved a workload of 10 METS and 111% FAC. The patient's exercise capacity was good although peak performance may have been compromised by the need to wear a mask   during exercise. The test was terminated due to fatigue and leg distress  (knee pain). Negative for chest pain. The stress ECG was negative for ischemia. VPC's  present at stress. Normal blood pressure response to

## 2025-01-09 ENCOUNTER — OFFICE VISIT (OUTPATIENT)
Age: 75
End: 2025-01-09
Payer: MEDICARE

## 2025-01-09 VITALS
HEIGHT: 71 IN | HEART RATE: 48 BPM | BODY MASS INDEX: 25.48 KG/M2 | SYSTOLIC BLOOD PRESSURE: 170 MMHG | DIASTOLIC BLOOD PRESSURE: 102 MMHG | WEIGHT: 182 LBS

## 2025-01-09 DIAGNOSIS — R00.2 PALPITATIONS: ICD-10-CM

## 2025-01-09 DIAGNOSIS — R00.0 TACHYCARDIA: ICD-10-CM

## 2025-01-09 DIAGNOSIS — R03.0 ELEVATED BLOOD PRESSURE READING IN OFFICE WITHOUT DIAGNOSIS OF HYPERTENSION: Primary | ICD-10-CM

## 2025-01-09 DIAGNOSIS — E78.5 DYSLIPIDEMIA: ICD-10-CM

## 2025-01-09 PROCEDURE — 1159F MED LIST DOCD IN RCRD: CPT | Performed by: INTERNAL MEDICINE

## 2025-01-09 PROCEDURE — 93000 ELECTROCARDIOGRAM COMPLETE: CPT | Performed by: INTERNAL MEDICINE

## 2025-01-09 PROCEDURE — 1126F AMNT PAIN NOTED NONE PRSNT: CPT | Performed by: INTERNAL MEDICINE

## 2025-01-09 PROCEDURE — G8419 CALC BMI OUT NRM PARAM NOF/U: HCPCS | Performed by: INTERNAL MEDICINE

## 2025-01-09 PROCEDURE — G8427 DOCREV CUR MEDS BY ELIG CLIN: HCPCS | Performed by: INTERNAL MEDICINE

## 2025-01-09 PROCEDURE — 1123F ACP DISCUSS/DSCN MKR DOCD: CPT | Performed by: INTERNAL MEDICINE

## 2025-01-09 PROCEDURE — 1160F RVW MEDS BY RX/DR IN RCRD: CPT | Performed by: INTERNAL MEDICINE

## 2025-01-09 PROCEDURE — 99214 OFFICE O/P EST MOD 30 MIN: CPT | Performed by: INTERNAL MEDICINE

## 2025-01-09 PROCEDURE — 1036F TOBACCO NON-USER: CPT | Performed by: INTERNAL MEDICINE

## 2025-01-19 NOTE — PROGRESS NOTES
[facility] CARDIOLOGY  [address] [place] [address], [address]  [place] [address]  PHONE: [phone]        NAME:  Vasyl Carlton Jr.  : 1950  MRN: [id number]     PCP:  Christel Mojica (Inactive)    SUBJECTIVE:   Vasyl Carlton Jr. is a 74 y.o. male seen for a follow-up visit regarding the following:     Chief Complaint   Patient presents with    Follow-up    Hypertension    Hyperlipidemia        HPI:    He presented recently to establish new cardiac care.  His watch has notified him that his resting heart rate has been intermittently in the 120 to 130 bpm range despite the absence of symptoms.  Now that he has become more hyperaware of tachycardia, he thinks he does feel palpitations during the iWatch notifications.  Again, he denies any other associated symptoms.      [facility] stress echo 2022:  REST IMAGES:Normal left ventricular chamber size. Normal global and regional left ventricular systolic function.   STRESS IMAGES:Stress echocardiogram negative for exercise-induced wall motion abnormalities. Ejection fraction response from 61% at rest to 70% at peak stress.   LIMITED VALVULAR AND HEMODYNAMIC ASSESSMENT   DIASTOLIC FUNCTIONLeft atrial volume index 48 ml/m2. Enlarged left atrial size. Indeterminate left ventricular diastolic function. No aortic valve regurgitation. No  aortic valve stenosis. Trivial mitral valve regurgitation. Trivial tricuspid valve   regurgitation.   STRESS TEST:The patient exercised for 9:00 min:sec on the Raji protocol. The patient achieved a workload of 10 METS and 111% FAC. The patient's exercise capacity was good although peak performance may have been compromised by the need to wear a mask   during exercise. The test was terminated due to fatigue and leg distress  (knee pain). Negative for chest pain. The stress ECG was negative for ischemia. VPC's  present at stress. Normal blood pressure response to stress. Reported level of perceived   exertion: 17. Adequate heart

## 2025-01-22 ENCOUNTER — OFFICE VISIT (OUTPATIENT)
Age: 75
End: 2025-01-22
Payer: MEDICARE

## 2025-01-22 VITALS
HEIGHT: 71 IN | BODY MASS INDEX: 24.92 KG/M2 | HEART RATE: 56 BPM | DIASTOLIC BLOOD PRESSURE: 72 MMHG | WEIGHT: 178 LBS | SYSTOLIC BLOOD PRESSURE: 138 MMHG

## 2025-01-22 DIAGNOSIS — R03.0 ELEVATED BLOOD PRESSURE READING IN OFFICE WITHOUT DIAGNOSIS OF HYPERTENSION: ICD-10-CM

## 2025-01-22 DIAGNOSIS — E78.5 DYSLIPIDEMIA: ICD-10-CM

## 2025-01-22 DIAGNOSIS — R00.0 TACHYCARDIA: Primary | ICD-10-CM

## 2025-01-22 DIAGNOSIS — R00.2 PALPITATIONS: ICD-10-CM

## 2025-01-22 PROCEDURE — G8420 CALC BMI NORM PARAMETERS: HCPCS | Performed by: INTERNAL MEDICINE

## 2025-01-22 PROCEDURE — 1126F AMNT PAIN NOTED NONE PRSNT: CPT | Performed by: INTERNAL MEDICINE

## 2025-01-22 PROCEDURE — 1160F RVW MEDS BY RX/DR IN RCRD: CPT | Performed by: INTERNAL MEDICINE

## 2025-01-22 PROCEDURE — 99214 OFFICE O/P EST MOD 30 MIN: CPT | Performed by: INTERNAL MEDICINE

## 2025-01-22 PROCEDURE — 1036F TOBACCO NON-USER: CPT | Performed by: INTERNAL MEDICINE

## 2025-01-22 PROCEDURE — G8427 DOCREV CUR MEDS BY ELIG CLIN: HCPCS | Performed by: INTERNAL MEDICINE

## 2025-01-22 PROCEDURE — 1159F MED LIST DOCD IN RCRD: CPT | Performed by: INTERNAL MEDICINE

## 2025-01-22 PROCEDURE — 1123F ACP DISCUSS/DSCN MKR DOCD: CPT | Performed by: INTERNAL MEDICINE

## 2025-01-23 ENCOUNTER — TELEPHONE (OUTPATIENT)
Age: 75
End: 2025-01-23

## 2025-01-23 DIAGNOSIS — I49.5 BRADY-TACHY SYNDROME (HCC): ICD-10-CM

## 2025-01-23 DIAGNOSIS — R00.2 PALPITATIONS: ICD-10-CM

## 2025-01-23 DIAGNOSIS — R00.0 TACHYCARDIA: Primary | ICD-10-CM

## 2025-01-23 RX ORDER — FLECAINIDE ACETATE 50 MG/1
25 TABLET ORAL 2 TIMES DAILY
Qty: 30 TABLET | Refills: 11
Start: 2025-01-23

## 2025-01-23 NOTE — TELEPHONE ENCOUNTER
Advised patient of Dr. Ruffin' response. Advised patient that someone will be calling tomorrow, to schedule EP consult. Patient verbalized understanding.   Requested Prescriptions     Signed Prescriptions Disp Refills    flecainide (TAMBOCOR) 50 MG tablet 30 tablet 11     Sig: Take 0.5 tablets by mouth 2 times daily     Authorizing Provider: LI RUFFIN     Ordering User: DEE VIGIL

## 2025-01-23 NOTE — TELEPHONE ENCOUNTER
Stop Toprol-XL.  Decrease flecainide to 25 mg twice daily  Expedite office visit with Dr. Pillai to discuss further, next office day he is there.  Work him in please

## 2025-01-23 NOTE — TELEPHONE ENCOUNTER
Izabella from [employer] called with the following episodes seen on Zio monitor:  1/11/25 3:25 am back to back pauses adding up to 21.3 seconds, page 16, strip 7.   1/15/25 5:33 am bradycardia with HR-30 x 60 seconds, page 19, strip 13.   Report is available in medical record.

## 2025-01-24 ENCOUNTER — TELEPHONE (OUTPATIENT)
Age: 75
End: 2025-01-24

## 2025-01-24 DIAGNOSIS — I49.5 BRADY-TACHY SYNDROME (HCC): ICD-10-CM

## 2025-01-24 DIAGNOSIS — R00.0 TACHYCARDIA: Primary | ICD-10-CM

## 2025-01-24 NOTE — TELEPHONE ENCOUNTER
Per Dr. Rosas Fontana will see patient on 1/31/25 at 7:45 am. Appointment has been scheduled. Left message on voicemail for patient to call this triage nurse. Left message on voicemail for wife, Alma, to call this triage nurse.

## 2025-01-24 NOTE — TELEPHONE ENCOUNTER
Notified Marizol of Dr. Duggan' request for appointment with Dr. Pillai next day in clinic, as above. She states she will discuss with Dr. Pillai and notify me of appointment date and time.

## 2025-01-24 NOTE — TELEPHONE ENCOUNTER
----- Message from Dr. Gildardo Duggan MD sent at 1/23/2025  5:07 PM EST -----    ----- Message -----  From: Gildardo Duggan MD  Sent: 1/23/2025   5:06 PM EST  To: Gildardo Duggan MD

## 2025-01-24 NOTE — TELEPHONE ENCOUNTER
Advised patient of 1/31/25 at 7:45 am MA appointment with Dr. Pillai. Advised patient to call with any questions or concerns prior to appointment. Patient verbalized understanding.

## 2025-01-31 ENCOUNTER — INITIAL CONSULT (OUTPATIENT)
Age: 75
End: 2025-01-31

## 2025-01-31 VITALS
HEIGHT: 71 IN | DIASTOLIC BLOOD PRESSURE: 80 MMHG | SYSTOLIC BLOOD PRESSURE: 138 MMHG | HEART RATE: 62 BPM | WEIGHT: 175.27 LBS | BODY MASS INDEX: 24.54 KG/M2

## 2025-01-31 DIAGNOSIS — R00.0 TACHYCARDIA: Primary | ICD-10-CM

## 2025-01-31 NOTE — PROGRESS NOTES
[facility] CARDIOLOGY, [address], [place] [address]  PHONE: [phone]  Vasyl Carlton Jr.  1950    Chief Complant:    Chief Complaint   Patient presents with    Tachycardia    Consultation      Consultation is requested by [unfilled] for evaluation of Tachycardia and Consultation    Reason for Consultation: AT    History:  Vasyl Carlton Jr. is a very pleasant 74 y.o. male with a past medical and cardiac history significant for HTN, HLD, AT and presents for EP consult for AT. He started flecainide and metoprolol for AT episodes, notes intermittent rapid HR, otherwise feels well. No chest pain, syncope, presyncope, or MOSCOSO. He is active, exercising, no exertional symptoms.     Cardiac PMH: (Old records have been reviewed and summarized below)  Monitor (1/9/25) personally viewed and interpreted: Sinus rhythm alternating with recurrent runs of atrial tachycardia at about 110-120 bpm. Some runs of atrial tachycardia lasting hours. Also with nighttime severe pauses/postconversion pauses up to 10 to 12 seconds. None during daytime hours.   TTE (3/12/24): EF 55-60%    Reviewed office note Dr. Duggan 1/22/25    Past Medical History, Past Surgical History, Family history, Social History, and Medications were all reviewed with the patient today and updated as necessary.     Current Outpatient Medications   Medication Sig Dispense Refill    flecainide (TAMBOCOR) 50 MG tablet Take 0.5 tablets by mouth 2 times daily 30 tablet 11    irbesartan (AVAPRO) 75 MG tablet Take 1 tablet by mouth in the morning and at bedtime (Patient taking differently: Take 1 tablet by mouth daily) 180 tablet 3    esomeprazole Magnesium (NEXIUM) 20 MG PACK Take 1 packet by mouth daily as needed      Multiple Vitamin (MULTIVITAMIN) tablet Take 1 tablet by mouth daily      rosuvastatin (CRESTOR) 20 MG tablet Take 1 tablet by mouth daily      tadalafil (CIALIS) 20 MG tablet Take 1 tablet by mouth daily as needed

## 2025-02-03 ENCOUNTER — TELEPHONE (OUTPATIENT)
Age: 75
End: 2025-02-03

## 2025-02-03 NOTE — TELEPHONE ENCOUNTER
Patient has been having high BP and heart rate since 02/01/25. Blood pressure running 153/94 and heart rate 103. No SOB or chest pain. Please call to advise

## 2025-02-03 NOTE — TELEPHONE ENCOUNTER
Patient reports that he saw Dr. Pillai on Friday. He has been having elevated blood pressures and heart rate as well as feeling fatigued since then. He denies Chest pain, SOB, edema. Does reports mild headache when BP elevated.  Sat     9 am     158/90                     4pm      123/67     HR 48  Sun     9am      157/87    HR 81             2pm      140/81    HR 77  Mon    1pm      153/94        Patient wanted to make Dr. Duggan aware to see if any changes needed.

## 2025-02-03 NOTE — TELEPHONE ENCOUNTER
It is the atrial tachycardia.  It may flareup a little bit with cessation of the metoprolol but he definitely needs to get on CPAP ASAP and continue the flecainide.  If he continues to have rapid heartbeat frequently, we may need to increase the flecainide but I would avoid the metoprolol if at all possible.

## 2025-02-03 NOTE — TELEPHONE ENCOUNTER
Notified of Dr. Duggan response. Patient reports he will have his CPAP in 7 days. Verbalized understanding of instructions given. Pt to call with worsening symptoms or follow up at ER.

## 2025-03-03 ENCOUNTER — TELEPHONE (OUTPATIENT)
Age: 75
End: 2025-03-03

## 2025-03-03 NOTE — TELEPHONE ENCOUNTER
Connie Thompson, PA  You1 hour ago (12:26 PM)       Was the Flecainide increased to 50 mg last call on 2/3? If not, please increase Flecainide to 50 mg q12h with EKG in one week. Thank you

## 2025-03-03 NOTE — TELEPHONE ENCOUNTER
Pt.reports that HR has been fluctuating last 2 days,HR got up to 150 earlier this am.HR just all over the place.He is on Flecainide 25mg bid.Current -156 and DBP 57-91.This irregularity is new for him.He did recently see  for AT consult.Will send note to EP for advice.

## 2025-03-04 ENCOUNTER — TELEPHONE (OUTPATIENT)
Age: 75
End: 2025-03-04

## 2025-03-04 DIAGNOSIS — I49.5 BRADY-TACHY SYNDROME (HCC): Primary | ICD-10-CM

## 2025-03-04 NOTE — TELEPHONE ENCOUNTER
Spoke with patient regarding Connie BELTRAN response. Patient reports that Dr. Pillai mentioned to patient during consult about an implantable monitor. Patient has thought about this and would like to go ahead with that option. I will forward this MD to see if we can bring him in to discuss.

## 2025-03-04 NOTE — TELEPHONE ENCOUNTER
Pt is wanting a follow up... Pt states that Jacki was going to reach out to Pillai office and call him back and he hasn't \"received a call and its been over 24 hours and this is unprofessional\"

## 2025-03-24 ENCOUNTER — TELEPHONE (OUTPATIENT)
Age: 75
End: 2025-03-24

## 2025-03-24 NOTE — TELEPHONE ENCOUNTER
Pt states he has been regulating heartbeat with water he is set for a loop recorded tomorrow and wanted to see if he can wait another month on this procedure

## 2025-03-24 NOTE — TELEPHONE ENCOUNTER
Per Dr. Pillai: ok for pt to postpone procedure one month.    Triage informed pt of Dr. Duggan' and Rosas' response. Pt verbalizes understanding. Triage informed cath lab and forwarding to Rosa Leon also for procedure to be rescheduled.

## 2025-03-25 NOTE — TELEPHONE ENCOUNTER
Sent [employer] message regarding procedure re-scheduling.     Called and left vm today.

## 2025-04-21 NOTE — PROGRESS NOTES
[facility] CARDIOLOGY  [address] [place] [address], [address]  [place] [address]  PHONE: [phone]        NAME:  Vasyl Carlton Jr.  : 1950  MRN: [id number]     PCP:  Cary Mojica MD    SUBJECTIVE:   Vasyl Carlton Jr. is a 74 y.o. male seen for a follow-up visit regarding the following:     Chief Complaint   Patient presents with    Tachycardia    Hypertension        HPI:    He presented recently to establish new cardiac care.  His watch has notified him that his resting heart rate has been intermittently in the 120 to 130 bpm range despite the absence of symptoms.      [facility] stress echo 2022:  REST IMAGES:Normal left ventricular chamber size. Normal global and regional left ventricular systolic function.   STRESS IMAGES:Stress echocardiogram negative for exercise-induced wall motion abnormalities. Ejection fraction response from 61% at rest to 70% at peak stress.   LIMITED VALVULAR AND HEMODYNAMIC ASSESSMENT   DIASTOLIC FUNCTIONLeft atrial volume index 48 ml/m2. Enlarged left atrial size. Indeterminate left ventricular diastolic function. No aortic valve regurgitation. No  aortic valve stenosis. Trivial mitral valve regurgitation. Trivial tricuspid valve   regurgitation.   STRESS TEST:The patient exercised for 9:00 min:sec on the Raji protocol. The patient achieved a workload of 10 METS and 111% FAC. The patient's exercise capacity was good although peak performance may have been compromised by the need to wear a mask   during exercise. The test was terminated due to fatigue and leg distress  (knee pain). Negative for chest pain. The stress ECG was negative for ischemia. VPC's  present at stress. Normal blood pressure response to stress. Reported level of perceived   exertion: 17. Adequate heart rate recovery. The patient experienced no complications.     Echocardiogram 3/12/2024:  Left Ventricle Normal left ventricular systolic function. EF by 2D [employer] Biplane is 59%. Left ventricle

## 2025-04-24 ENCOUNTER — OFFICE VISIT (OUTPATIENT)
Age: 75
End: 2025-04-24
Payer: MEDICARE

## 2025-04-24 VITALS
HEIGHT: 70 IN | SYSTOLIC BLOOD PRESSURE: 102 MMHG | HEART RATE: 56 BPM | DIASTOLIC BLOOD PRESSURE: 60 MMHG | WEIGHT: 178 LBS | BODY MASS INDEX: 25.48 KG/M2

## 2025-04-24 DIAGNOSIS — R03.0 ELEVATED BLOOD PRESSURE READING IN OFFICE WITHOUT DIAGNOSIS OF HYPERTENSION: ICD-10-CM

## 2025-04-24 DIAGNOSIS — E78.5 DYSLIPIDEMIA: ICD-10-CM

## 2025-04-24 DIAGNOSIS — R00.0 TACHYCARDIA: Primary | ICD-10-CM

## 2025-04-24 DIAGNOSIS — R00.2 PALPITATIONS: ICD-10-CM

## 2025-04-24 PROCEDURE — 1123F ACP DISCUSS/DSCN MKR DOCD: CPT | Performed by: INTERNAL MEDICINE

## 2025-04-24 PROCEDURE — G8427 DOCREV CUR MEDS BY ELIG CLIN: HCPCS | Performed by: INTERNAL MEDICINE

## 2025-04-24 PROCEDURE — 1159F MED LIST DOCD IN RCRD: CPT | Performed by: INTERNAL MEDICINE

## 2025-04-24 PROCEDURE — 1036F TOBACCO NON-USER: CPT | Performed by: INTERNAL MEDICINE

## 2025-04-24 PROCEDURE — 1160F RVW MEDS BY RX/DR IN RCRD: CPT | Performed by: INTERNAL MEDICINE

## 2025-04-24 PROCEDURE — 99214 OFFICE O/P EST MOD 30 MIN: CPT | Performed by: INTERNAL MEDICINE

## 2025-04-24 PROCEDURE — G8419 CALC BMI OUT NRM PARAM NOF/U: HCPCS | Performed by: INTERNAL MEDICINE

## 2025-06-18 ENCOUNTER — TELEPHONE (OUTPATIENT)
Age: 75
End: 2025-06-18

## 2025-06-18 NOTE — TELEPHONE ENCOUNTER
Patient called stating that he has been having irregular heart beat with heart rate . Patient is going on vacation in a few days for about a week and wanted to know if this would be okay. Patient is taking flecainide 50 bid and irbesartan 75 mg bid. Patient states that if he drinks a lot of water, his heart rate will come down. B/p has been running 123-180/70-75. Patient not having any symptoms.     Last office visit 4-24-25

## 2025-06-18 NOTE — TELEPHONE ENCOUNTER
Continue current meds but if tachycardia persists or recurs with increased frequency or duration, increase flecainide to 100 mg twice daily for 1 to 2 days in a row to try to suppress it.  Stay hydrated, minimize alcohol, caffeine.  Have fun on his trip.  Should be fine

## 2025-07-17 RX ORDER — FLECAINIDE ACETATE 50 MG/1
50 TABLET ORAL 2 TIMES DAILY
Qty: 45 TABLET | Refills: 3 | Status: SHIPPED | OUTPATIENT
Start: 2025-07-17

## 2025-07-17 NOTE — TELEPHONE ENCOUNTER
MEDICATION REFILL REQUEST      Name of Medication:  Flecainide  Dose:  50 mg  Frequency:  1/2 pill 2 times a day  Quantity:  90  Days' supply:  90 with 3 refills      Pharmacy Name/Location:  [phone]    Pt is also asking if we will please cancel the rx we sent for metoprolol because pt does not take this anymore

## 2025-08-19 RX ORDER — FLECAINIDE ACETATE 50 MG/1
TABLET ORAL
Qty: 180 TABLET | Refills: 3 | Status: SHIPPED | OUTPATIENT
Start: 2025-08-19